# Patient Record
Sex: MALE | Race: WHITE | NOT HISPANIC OR LATINO | URBAN - METROPOLITAN AREA
[De-identification: names, ages, dates, MRNs, and addresses within clinical notes are randomized per-mention and may not be internally consistent; named-entity substitution may affect disease eponyms.]

---

## 2018-01-12 ENCOUNTER — EMERGENCY (EMERGENCY)
Facility: HOSPITAL | Age: 58
LOS: 1 days | Discharge: HOME | End: 2018-01-12
Attending: EMERGENCY MEDICINE | Admitting: EMERGENCY MEDICINE
Payer: SELF-PAY

## 2018-01-12 VITALS
TEMPERATURE: 98 F | OXYGEN SATURATION: 99 % | DIASTOLIC BLOOD PRESSURE: 76 MMHG | HEIGHT: 68 IN | SYSTOLIC BLOOD PRESSURE: 113 MMHG | HEART RATE: 82 BPM | RESPIRATION RATE: 20 BRPM | WEIGHT: 169.98 LBS

## 2018-01-12 PROCEDURE — 73620 X-RAY EXAM OF FOOT: CPT

## 2018-01-12 PROCEDURE — 73620 X-RAY EXAM OF FOOT: CPT | Mod: 26,50

## 2018-01-12 PROCEDURE — 99284 EMERGENCY DEPT VISIT MOD MDM: CPT

## 2018-01-12 PROCEDURE — 99284 EMERGENCY DEPT VISIT MOD MDM: CPT | Mod: 25

## 2018-01-12 RX ORDER — CEPHALEXIN 500 MG
500 CAPSULE ORAL ONCE
Qty: 0 | Refills: 0 | Status: COMPLETED | OUTPATIENT
Start: 2018-01-12 | End: 2018-01-12

## 2018-01-12 RX ORDER — CEPHALEXIN 500 MG
1 CAPSULE ORAL
Qty: 28 | Refills: 0 | OUTPATIENT
Start: 2018-01-12 | End: 2018-01-18

## 2018-01-12 RX ORDER — IBUPROFEN 200 MG
600 TABLET ORAL ONCE
Qty: 0 | Refills: 0 | Status: COMPLETED | OUTPATIENT
Start: 2018-01-12 | End: 2018-01-12

## 2018-01-12 RX ADMIN — Medication 500 MILLIGRAM(S): at 17:34

## 2018-01-12 RX ADMIN — Medication 600 MILLIGRAM(S): at 17:34

## 2018-01-12 NOTE — ED STATDOCS - ATTENDING CONTRIBUTION TO CARE
I, Linus Faustin, performed the initial face to face bedside interview with this patient regarding history of present illness, review of symptoms and relevant past medical, social and family history.  I completed an independent physical examination.  I was the initial provider who evaluated this patient. I have signed out the follow up of any pending tests (i.e. labs, radiological studies) to the ACP.  I have communicated the patient’s plan of care and disposition with the ACP.  The history, relevant review of systems, past medical and surgical history, medical decision making, and physical examination was documented by the scribe in my presence and I attest to the accuracy of the documentation.

## 2018-01-12 NOTE — ED ADULT NURSE NOTE - OBJECTIVE STATEMENT
pt alert and awake x3, arrived to ED with frostbite, state he shoveled snow in the cold 2 weeks ago and states his feet got wet, states he went inside after, present with blisters to feet, no discoloration, foul smell from feet, pt states he is unable to walk, ambulates with steady gait, denies chest pain/sob.

## 2018-01-12 NOTE — ED STATDOCS - PROGRESS NOTE DETAILS
XR WNL, Low suspicion for frost bite  Likely blister 2/2 to new boots  Toe wrapped, Will dx with keflex and naproxen  F/u podiatry, Return precautions discussed

## 2018-01-12 NOTE — ED STATDOCS - OBJECTIVE STATEMENT
56 y/o M pt with a pmhx of presents to the ED c/o R foot and R toe pain with blister worsening x2 weeks. Explains that he had wet shoes on for 3 days at a time. He then continually had wet socks for 1 week. Admits to fever and lightheadedness. Denies cough, abdominal pain, headache, n/v/d/c, urinary symptoms, chest pain, sob, back pain, blurred vision, sinus congestion, recent travel, sick contacts, recent ABx, STD exposure, hematuria, recent trauma or any other complaints. NKDA.

## 2025-02-05 NOTE — ED ADULT NURSE NOTE - HOW OFTEN DO YOU HAVE A DRINK CONTAINING ALCOHOL?
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 CNS IPI High risk.  But would not offer CNS prophylaxis given there is lack of efficacy and unlikely would tolerate high-dose methotrexate after completion of chemotherapy. - 04/24/2024 CT scan: 4.5 x 3.5 x 4 cm solid enhancing mass with large area of central low attenuation change in the inferior aspect of the right perinephric space abuts the lower pole of the right kidney without interdigitation.  Distally in the lateral canal fascia there is a mass measuring 1.6 x 1.5 x 1.5 cm.  - Biopsy of the perinephric mass came back as Diffuse large B-cell lymphoma, NOS. Germinal center B-cell type. - 06/24/2024 PET scan: Numerous foci of pathological uptake compatible with tumor activity involving scalp, peritoneal and retroperitoneal soft tissue nodules, left perinephric soft tissue nodule, T1-T2 vertebra, right S1 and S 2 and mass like soft tissue thickening involving a short segment of small bowel within the pelvis. - 06/24/2024 ECHO - Normal EF 56%. - HIV, Hep negative. - LDH was just above normal, but hemolyzed. - 06/24/2024 Started on mini RCHOP - C1 Tolerated well. - 09/05/2024 CT C/A/P - No evidence of acute thoracic pathology. Old rib fractures. Since June 20, 2024, no new abdominal or pelvic adenopathy/soft tissue mass. Substantial interval improvement in previously noted peritoneal/retroperitoneal soft tissue nodules/masses, with a residual 3.3 cm right perinephric soft tissue mass. Substantial interval improvement in previously noted right sacral soft tissue mass, with mild residual soft tissue density involving the right S2 foramen. - 10/11/2024 completed 6 cycles of mini RCHOP - tolerated well. - 11/01/2024 PET - Near complete resolution of all prior FDG avid lymph nodes and bone lesions. FDG avid 3.3 cm right perinephric soft tissue mass is unchanged in size since CT on 9/5/2024, SUV max 4.1. It previously measured up to 4.8 cm with an SUV max of 30.5 (87% decrease). Findings are consistent with a good treatment response. Focal new FDG uptake, SUV max 5.4 seen towards the right posterior prostatic apex. This is indeterminate for biologic tumor activity.  02/03/2025 Labs reviewed and results discussed with the patient and his wife - remains clinically stable to continue current management. All questions were answered to satisfaction.  PLAN: - continue PORT flush. - continue supportive medications PRN and knows how to use him. - repeat PET/CT - ordered. - Labs today: CBC CMP PSA RTC in 3 months with CBC CMP LDH Never